# Patient Record
Sex: MALE | Race: BLACK OR AFRICAN AMERICAN
[De-identification: names, ages, dates, MRNs, and addresses within clinical notes are randomized per-mention and may not be internally consistent; named-entity substitution may affect disease eponyms.]

---

## 2021-10-19 ENCOUNTER — HOSPITAL ENCOUNTER (OUTPATIENT)
Dept: HOSPITAL 61 - LAB | Age: 56
End: 2021-10-19
Attending: SURGERY
Payer: COMMERCIAL

## 2021-10-19 DIAGNOSIS — Z01.812: Primary | ICD-10-CM

## 2021-10-19 DIAGNOSIS — Z20.822: ICD-10-CM

## 2021-10-19 DIAGNOSIS — K40.90: ICD-10-CM

## 2021-10-19 PROCEDURE — U0003 INFECTIOUS AGENT DETECTION BY NUCLEIC ACID (DNA OR RNA); SEVERE ACUTE RESPIRATORY SYNDROME CORONAVIRUS 2 (SARS-COV-2) (CORONAVIRUS DISEASE [COVID-19]), AMPLIFIED PROBE TECHNIQUE, MAKING USE OF HIGH THROUGHPUT TECHNOLOGIES AS DESCRIBED BY CMS-2020-01-R: HCPCS

## 2021-10-27 ENCOUNTER — HOSPITAL ENCOUNTER (OUTPATIENT)
Dept: HOSPITAL 61 - LAB | Age: 56
End: 2021-10-27
Attending: SURGERY
Payer: COMMERCIAL

## 2021-10-27 DIAGNOSIS — Z20.822: ICD-10-CM

## 2021-10-27 DIAGNOSIS — Z01.812: Primary | ICD-10-CM

## 2021-10-27 PROCEDURE — U0003 INFECTIOUS AGENT DETECTION BY NUCLEIC ACID (DNA OR RNA); SEVERE ACUTE RESPIRATORY SYNDROME CORONAVIRUS 2 (SARS-COV-2) (CORONAVIRUS DISEASE [COVID-19]), AMPLIFIED PROBE TECHNIQUE, MAKING USE OF HIGH THROUGHPUT TECHNOLOGIES AS DESCRIBED BY CMS-2020-01-R: HCPCS

## 2021-10-29 ENCOUNTER — HOSPITAL ENCOUNTER (OUTPATIENT)
Dept: HOSPITAL 61 - SURG | Age: 56
Discharge: HOME | End: 2021-10-29
Attending: SURGERY
Payer: COMMERCIAL

## 2021-10-29 VITALS — HEIGHT: 71 IN | WEIGHT: 184.09 LBS | BODY MASS INDEX: 25.77 KG/M2

## 2021-10-29 VITALS — DIASTOLIC BLOOD PRESSURE: 81 MMHG | SYSTOLIC BLOOD PRESSURE: 123 MMHG

## 2021-10-29 DIAGNOSIS — Z98.890: ICD-10-CM

## 2021-10-29 DIAGNOSIS — I10: ICD-10-CM

## 2021-10-29 DIAGNOSIS — Z79.899: ICD-10-CM

## 2021-10-29 DIAGNOSIS — Z87.891: ICD-10-CM

## 2021-10-29 DIAGNOSIS — K40.30: Primary | ICD-10-CM

## 2021-10-29 PROCEDURE — A4930 STERILE, GLOVES PER PAIR: HCPCS

## 2021-10-29 PROCEDURE — A4364 ADHESIVE, LIQUID OR EQUAL: HCPCS

## 2021-10-29 PROCEDURE — A6219 GAUZE <= 16 SQ IN W/BORDER: HCPCS

## 2021-10-29 PROCEDURE — A4657 SYRINGE W/WO NEEDLE: HCPCS

## 2021-10-29 PROCEDURE — 49650 LAP ING HERNIA REPAIR INIT: CPT

## 2021-10-29 PROCEDURE — C1781 MESH (IMPLANTABLE): HCPCS

## 2021-10-29 PROCEDURE — A4223 INFUSION SUPPLIES W/O PUMP: HCPCS

## 2021-10-29 RX ADMIN — MORPHINE SULFATE PRN MG: 2 INJECTION, SOLUTION INTRAMUSCULAR; INTRAVENOUS at 13:48

## 2021-10-29 RX ADMIN — MORPHINE SULFATE PRN MG: 2 INJECTION, SOLUTION INTRAMUSCULAR; INTRAVENOUS at 13:59

## 2021-10-29 NOTE — PDOC4
Operative Note


Operative Note


Date: October 29 of 2021 at 1308


Preoperative diagnosis left inguinal hernia


Postoperative diagnosis: Same


Procedure: Robotic assisted laparoscopic left inguinal hernia repair with mesh


Surgeon: Adria


Specimen: None


Dictation: Patient is a 56-year-old gentleman who is seen in the emergency 

department with an incarcerated left inguinal hernia which was reduced at the 

time.  Now he follows up to have repair.  The procedure of robotic assisted lapa

roscopic left inguinal hernia repair with mesh was explained to the patient in 

detail risk benefits were also discussed including bleeding infection injury to 

intra-abdominal contents possible necessitating further open operations 

alternatives to this procedure also discussed with the patient who seemed to 

understand and gave a verbal and written consent to have the procedure perfo

rmed.  Patient was taken to the operating room placed in the supine position 

general anesthesia was initiated once patient was sleeping intubated his abdomen

was prepped and draped usual sterile fashion using ChloraPrep.  Area just below 

the umbilicus was injected with quarter percent Marcaine with epinephrine 

incision was made 11 blade scalpel and a varies needle was placed within the 

abdomen creating pneumoperitoneum once this was complete a millimeter da Abida 

port was placed and a a Mae da Abida camera is placed within the abdomen 

which was inspected no other abnormalities were noted it was noted that the left

inguinal hernia did have some incarcerated colon within the hernia defect.  8 mm

da Abida ports placed in the left midabdomen and an 8 mm da Abida ports placed 

in the right midabdomen the da Abida robot is brought and docked all port sites 

the surgeon went to the robotic console using a grasper and Endo Jenna scissors

the incarcerated colon within the hernia defect was reduced the peritoneum over 

the left side was incised a window was propagated posteriorly reducing the 

hernia sac and contents.  A large Bard 3D max mesh for the left side was placed 

and the peritoneum was closed with a running 2 OV lock absorbable suture.  

Suture was moved from the abdomen the pneumoperitoneum was reduced da Abida 

robot was undocked and removed all ports were removed and the port sites were 

all closed with 4-0 subcuticular Monocryl Mastisol Steri-Strips and island 

dressings were applied.  Patient was awakened extubated in the operating room 

taken recovery in stable condition all sponge instrument needle counts listed as

correct estimated blood loss 20 mL.











TRUNG MCGRATH MD             Oct 29, 2021 13:11

## 2021-10-29 NOTE — DISCH
DISCHARGE INSTRUCTIONS


Condition on Discharge


Condition on Discharge:  Stable





Activity After Discharge


Activity Instructions for Disc:  Avoid exertion


Other activity instructions:  No lifting more than 20 pounds for 2-week





Diet after Discharge


Diet after Discharge:  Regular





Wound Incision Care


Other wound/incision instructi:  May shower in 24 hours





Contacting the  after DC


Call your doctor for:  If your condition worsens





Follow-Up


Follow up with:  Dr. Mcgrath in 2 weeks











TRUNG MCGRATH MD             Oct 29, 2021 13:14